# Patient Record
Sex: MALE | Race: WHITE | Employment: OTHER | URBAN - NONMETROPOLITAN AREA
[De-identification: names, ages, dates, MRNs, and addresses within clinical notes are randomized per-mention and may not be internally consistent; named-entity substitution may affect disease eponyms.]

---

## 2019-11-07 ENCOUNTER — OFFICE VISIT (OUTPATIENT)
Dept: SURGERY | Age: 64
End: 2019-11-07

## 2019-11-07 ENCOUNTER — TELEPHONE (OUTPATIENT)
Dept: SURGERY | Age: 64
End: 2019-11-07

## 2019-11-07 VITALS
OXYGEN SATURATION: 97 % | BODY MASS INDEX: 32.78 KG/M2 | WEIGHT: 229 LBS | HEART RATE: 90 BPM | RESPIRATION RATE: 20 BRPM | DIASTOLIC BLOOD PRESSURE: 72 MMHG | HEIGHT: 70 IN | SYSTOLIC BLOOD PRESSURE: 108 MMHG | TEMPERATURE: 96.7 F

## 2019-11-07 DIAGNOSIS — Z12.11 ENCOUNTER FOR SCREENING COLONOSCOPY: Primary | ICD-10-CM

## 2019-11-07 PROCEDURE — 99999 PR OFFICE/OUTPT VISIT,PROCEDURE ONLY: CPT | Performed by: SURGERY

## 2019-11-07 RX ORDER — CARBIDOPA AND LEVODOPA 50; 200 MG/1; MG/1
1 TABLET, EXTENDED RELEASE ORAL 4 TIMES DAILY
COMMUNITY
Start: 2019-08-19

## 2019-11-07 RX ORDER — SODIUM CHLORIDE 450 MG/100ML
INJECTION, SOLUTION INTRAVENOUS CONTINUOUS
Status: CANCELLED | OUTPATIENT
Start: 2019-11-07

## 2019-11-07 RX ORDER — SERTRALINE HYDROCHLORIDE 100 MG/1
1 TABLET, FILM COATED ORAL DAILY
COMMUNITY
Start: 2019-03-28

## 2019-11-07 RX ORDER — SITAGLIPTIN 100 MG/1
1 TABLET, FILM COATED ORAL DAILY
Refills: 3 | COMMUNITY
Start: 2019-09-13

## 2019-11-08 ASSESSMENT — ENCOUNTER SYMPTOMS
SINUS PAIN: 0
SORE THROAT: 0
FACIAL SWELLING: 0
PHOTOPHOBIA: 0
RESPIRATORY NEGATIVE: 1
WHEEZING: 0
VOICE CHANGE: 0
EYES NEGATIVE: 1
ALLERGIC/IMMUNOLOGIC NEGATIVE: 1
STRIDOR: 0
COLOR CHANGE: 0
RHINORRHEA: 0
CHEST TIGHTNESS: 0
BACK PAIN: 0
GASTROINTESTINAL NEGATIVE: 1
APNEA: 0
EYE REDNESS: 0
SHORTNESS OF BREATH: 0
EYE ITCHING: 0
TROUBLE SWALLOWING: 0
COUGH: 0
ABDOMINAL DISTENTION: 0
ANAL BLEEDING: 0
EYE PAIN: 0
SINUS PRESSURE: 0
EYE DISCHARGE: 0
CHOKING: 0

## 2019-11-20 RX ORDER — SODIUM CHLORIDE 450 MG/100ML
INJECTION, SOLUTION INTRAVENOUS CONTINUOUS
Status: CANCELLED | OUTPATIENT
Start: 2019-11-20

## 2019-11-21 ENCOUNTER — ANESTHESIA EVENT (OUTPATIENT)
Dept: ENDOSCOPY | Age: 64
End: 2019-11-21
Payer: MEDICARE

## 2019-11-21 ENCOUNTER — HOSPITAL ENCOUNTER (OUTPATIENT)
Age: 64
Setting detail: OUTPATIENT SURGERY
Discharge: HOME | End: 2019-11-21
Attending: SURGERY | Admitting: SURGERY
Payer: MEDICARE

## 2019-11-21 ENCOUNTER — ANESTHESIA (OUTPATIENT)
Dept: ENDOSCOPY | Age: 64
End: 2019-11-21
Payer: MEDICARE

## 2019-11-21 VITALS
HEART RATE: 95 BPM | DIASTOLIC BLOOD PRESSURE: 70 MMHG | OXYGEN SATURATION: 96 % | SYSTOLIC BLOOD PRESSURE: 130 MMHG | RESPIRATION RATE: 16 BRPM | TEMPERATURE: 97.3 F

## 2019-11-21 VITALS
SYSTOLIC BLOOD PRESSURE: 106 MMHG | RESPIRATION RATE: 11 BRPM | OXYGEN SATURATION: 96 % | DIASTOLIC BLOOD PRESSURE: 65 MMHG

## 2019-11-21 PROCEDURE — 2580000003 HC RX 258: Performed by: NURSE ANESTHETIST, CERTIFIED REGISTERED

## 2019-11-21 PROCEDURE — 3609010300 HC COLONOSCOPY W/BIOPSY SINGLE/MULTIPLE: Performed by: SURGERY

## 2019-11-21 PROCEDURE — 2500000003 HC RX 250 WO HCPCS: Performed by: NURSE ANESTHETIST, CERTIFIED REGISTERED

## 2019-11-21 PROCEDURE — 3700000000 HC ANESTHESIA ATTENDED CARE: Performed by: SURGERY

## 2019-11-21 PROCEDURE — 6360000002 HC RX W HCPCS: Performed by: NURSE ANESTHETIST, CERTIFIED REGISTERED

## 2019-11-21 PROCEDURE — 2709999900 HC NON-CHARGEABLE SUPPLY: Performed by: SURGERY

## 2019-11-21 PROCEDURE — 3700000001 HC ADD 15 MINUTES (ANESTHESIA): Performed by: SURGERY

## 2019-11-21 PROCEDURE — 88305 TISSUE EXAM BY PATHOLOGIST: CPT

## 2019-11-21 PROCEDURE — 7100000000 HC PACU RECOVERY - FIRST 15 MIN: Performed by: SURGERY

## 2019-11-21 PROCEDURE — 7100000001 HC PACU RECOVERY - ADDTL 15 MIN: Performed by: SURGERY

## 2019-11-21 PROCEDURE — 45380 COLONOSCOPY AND BIOPSY: CPT | Performed by: SURGERY

## 2019-11-21 RX ORDER — PROPOFOL 10 MG/ML
INJECTION, EMULSION INTRAVENOUS PRN
Status: DISCONTINUED | OUTPATIENT
Start: 2019-11-21 | End: 2019-11-21 | Stop reason: SDUPTHER

## 2019-11-21 RX ORDER — LIDOCAINE HYDROCHLORIDE 20 MG/ML
INJECTION, SOLUTION INFILTRATION; PERINEURAL PRN
Status: DISCONTINUED | OUTPATIENT
Start: 2019-11-21 | End: 2019-11-21 | Stop reason: SDUPTHER

## 2019-11-21 RX ORDER — SODIUM CHLORIDE 9 MG/ML
INJECTION, SOLUTION INTRAVENOUS CONTINUOUS PRN
Status: DISCONTINUED | OUTPATIENT
Start: 2019-11-21 | End: 2019-11-21 | Stop reason: SDUPTHER

## 2019-11-21 RX ADMIN — PROPOFOL 100 MG: 10 INJECTION, EMULSION INTRAVENOUS at 08:12

## 2019-11-21 RX ADMIN — PROPOFOL 50 MG: 10 INJECTION, EMULSION INTRAVENOUS at 08:36

## 2019-11-21 RX ADMIN — PROPOFOL 100 MG: 10 INJECTION, EMULSION INTRAVENOUS at 08:21

## 2019-11-21 RX ADMIN — LIDOCAINE HYDROCHLORIDE 4 ML: 20 INJECTION, SOLUTION INFILTRATION; PERINEURAL at 08:12

## 2019-11-21 RX ADMIN — SODIUM CHLORIDE: 9 INJECTION, SOLUTION INTRAVENOUS at 08:10

## 2019-11-21 RX ADMIN — PROPOFOL 100 MG: 10 INJECTION, EMULSION INTRAVENOUS at 08:29

## 2019-12-04 DIAGNOSIS — Z12.11 ENCOUNTER FOR SCREENING COLONOSCOPY: ICD-10-CM

## 2024-04-04 ENCOUNTER — TELEPHONE (OUTPATIENT)
Dept: ADMINISTRATIVE | Age: 69
End: 2024-04-04

## 2024-04-04 NOTE — TELEPHONE ENCOUNTER
Pt spouse is calling and pt heart rate had went to 245 twice in February and his parkinson dr wants him to be seen and they are requesting Dr. Hernandez.  Please contact pt spouse, Luma at [phone]

## 2024-04-05 NOTE — TELEPHONE ENCOUNTER
Spoke to patient's wife.  They took him to ER [facility].     Requesting an appt.     Appt made with pt's wife.

## 2024-04-08 ENCOUNTER — OFFICE VISIT (OUTPATIENT)
Dept: CARDIOLOGY CLINIC | Age: 69
End: 2024-04-08
Payer: MEDICARE

## 2024-04-08 VITALS
DIASTOLIC BLOOD PRESSURE: 74 MMHG | BODY MASS INDEX: 27.98 KG/M2 | HEART RATE: 82 BPM | SYSTOLIC BLOOD PRESSURE: 112 MMHG | WEIGHT: 195 LBS

## 2024-04-08 DIAGNOSIS — I10 PRIMARY HYPERTENSION: ICD-10-CM

## 2024-04-08 DIAGNOSIS — I25.10 CORONARY ARTERY DISEASE INVOLVING NATIVE CORONARY ARTERY OF NATIVE HEART WITHOUT ANGINA PECTORIS: ICD-10-CM

## 2024-04-08 DIAGNOSIS — R00.0 TACHYCARDIA: Primary | ICD-10-CM

## 2024-04-08 PROCEDURE — 99204 OFFICE O/P NEW MOD 45 MIN: CPT | Performed by: NUCLEAR MEDICINE

## 2024-04-08 PROCEDURE — 3017F COLORECTAL CA SCREEN DOC REV: CPT | Performed by: NUCLEAR MEDICINE

## 2024-04-08 PROCEDURE — G8419 CALC BMI OUT NRM PARAM NOF/U: HCPCS | Performed by: NUCLEAR MEDICINE

## 2024-04-08 PROCEDURE — 3078F DIAST BP <80 MM HG: CPT | Performed by: NUCLEAR MEDICINE

## 2024-04-08 PROCEDURE — 3074F SYST BP LT 130 MM HG: CPT | Performed by: NUCLEAR MEDICINE

## 2024-04-08 PROCEDURE — 93000 ELECTROCARDIOGRAM COMPLETE: CPT | Performed by: NUCLEAR MEDICINE

## 2024-04-08 PROCEDURE — 4004F PT TOBACCO SCREEN RCVD TLK: CPT | Performed by: NUCLEAR MEDICINE

## 2024-04-08 PROCEDURE — 1123F ACP DISCUSS/DSCN MKR DOCD: CPT | Performed by: NUCLEAR MEDICINE

## 2024-04-08 PROCEDURE — G8427 DOCREV CUR MEDS BY ELIG CLIN: HCPCS | Performed by: NUCLEAR MEDICINE

## 2024-04-08 RX ORDER — LANOLIN ALCOHOL/MO/W.PET/CERES
CREAM (GRAM) TOPICAL DAILY
COMMUNITY

## 2024-04-08 RX ORDER — METOPROLOL TARTRATE 50 MG/1
50 TABLET, FILM COATED ORAL DAILY
COMMUNITY
Start: 2024-03-09

## 2024-04-08 RX ORDER — MULTIVIT-MIN/IRON/FOLIC ACID/K 18-600-40
2000 CAPSULE ORAL DAILY
COMMUNITY

## 2024-04-08 RX ORDER — FLUDROCORTISONE ACETATE 0.1 MG/1
0.1 TABLET ORAL DAILY
COMMUNITY
Start: 2024-03-26

## 2024-04-08 RX ORDER — MAGNESIUM GLUCONATE 27 MG(500)
250 TABLET ORAL DAILY
COMMUNITY

## 2024-04-08 RX ORDER — QUETIAPINE FUMARATE 25 MG/1
25 TABLET, FILM COATED ORAL
COMMUNITY
Start: 2022-10-16 | End: 2024-04-08

## 2024-04-08 RX ORDER — RIVASTIGMINE 9.5 MG/24H
1 PATCH, EXTENDED RELEASE TRANSDERMAL DAILY
COMMUNITY
Start: 2022-05-22

## 2024-04-08 ASSESSMENT — ENCOUNTER SYMPTOMS
ABDOMINAL PAIN: 0
PHOTOPHOBIA: 0
DIARRHEA: 0
BLOOD IN STOOL: 0
COLOR CHANGE: 0
CONSTIPATION: 0
VOMITING: 0
SHORTNESS OF BREATH: 0
CHEST TIGHTNESS: 0
RECTAL PAIN: 0
BACK PAIN: 1
ABDOMINAL DISTENTION: 0
ANAL BLEEDING: 0
NAUSEA: 0

## 2024-04-08 NOTE — PROGRESS NOTES
[facility] [place] [facility]  [facility] CARDIOLOGY  [address]  [address]  [facility] [address]  Dept: [phone]  Dept Fax: [phone]  Loc: [phone]    Visit Date: 4/8/2024    Lukas Conner is a 69 y.o. male who presents todayfor:  Chief Complaint   Patient presents with    Hypertension    Coronary Artery Disease    Dizziness   Here for the first time  Saw Wojciech before  Cath 2013   Mild CAD  Severe disability from Parkinson's   As well dementia  Here because he was in the ER for high BP  Did have some symptoms with it   Labile BP  Some limitation and dizziness  Runs low at times   No active chest pain   Some baseline dyspnea  No smoking  Family history of CAD      HPI:  Hypertension  Pertinent negatives include no chest pain, neck pain, palpitations or shortness of breath.   Coronary Artery Disease  Symptoms include dizziness. Pertinent negatives include no chest pain, chest tightness, palpitations or shortness of breath.   Dizziness  Associated symptoms include arthralgias and fatigue. Pertinent negatives include no abdominal pain, chest pain, joint swelling, myalgias, nausea, neck pain, rash or vomiting.     Past Medical History:   Diagnosis Date    Abnormal EKG w/ generalized low voltages and possible inferior wall MI     Diabetes mellitus (HCC)     HLD (hyperlipidemia)     HTN (hypertension)     Parkinson disease (HCC)       Past Surgical History:   Procedure Laterality Date    COLECTOMY  2/24/11    Stress test - No evidence of stress induced ischemia or prior transmural MI. EF 63%    COLONOSCOPY      COLONOSCOPY Left 11/21/2019    COLONOSCOPY WITH BIOPSY performed by Wili Ritter MD at [facility]    HERNIA REPAIR  2004    TRANSTHORACIC ECHOCARDIOGRAM  02/18/2011    Left ventricle mild to mod dilated. EF 55-65%. No regional wall motion abnormalities. Right ventricle mildly dilated.      Family History   Problem Relation Age of Onset    Alzheimer's Disease Mother

## 2024-04-17 ENCOUNTER — HOSPITAL ENCOUNTER (OUTPATIENT)
Age: 69
Discharge: HOME | End: 2024-04-19
Attending: NUCLEAR MEDICINE
Payer: MEDICARE

## 2024-04-17 VITALS
SYSTOLIC BLOOD PRESSURE: 112 MMHG | HEIGHT: 70 IN | WEIGHT: 195 LBS | BODY MASS INDEX: 27.92 KG/M2 | DIASTOLIC BLOOD PRESSURE: 74 MMHG

## 2024-04-17 DIAGNOSIS — I25.10 CORONARY ARTERY DISEASE INVOLVING NATIVE CORONARY ARTERY OF NATIVE HEART WITHOUT ANGINA PECTORIS: ICD-10-CM

## 2024-04-17 DIAGNOSIS — R00.0 TACHYCARDIA: ICD-10-CM

## 2024-04-17 DIAGNOSIS — I10 PRIMARY HYPERTENSION: ICD-10-CM

## 2024-04-17 LAB
ECHO AO ASC DIAM: 4.3 CM
ECHO AO ASCENDING AORTA INDEX: 2.09 CM/M2
ECHO AO SINUS VALSALVA DIAM: 4.6 CM
ECHO AO SINUS VALSALVA INDEX: 2.23 CM/M2
ECHO AV CUSP MM: 2.2 CM
ECHO AV PEAK GRADIENT: 3 MMHG
ECHO AV PEAK VELOCITY: 0.9 M/S
ECHO AV VELOCITY RATIO: 0.89
ECHO BSA: 2.09 M2
ECHO LA AREA 2C: 11.9 CM2
ECHO LA AREA 4C: 13.9 CM2
ECHO LA DIAMETER INDEX: 1.84 CM/M2
ECHO LA DIAMETER: 3.8 CM
ECHO LA MAJOR AXIS: 4.1 CM
ECHO LA MINOR AXIS: 4.2 CM
ECHO LA VOL BP: 33 ML (ref 18–58)
ECHO LA VOL MOD A2C: 28 ML (ref 18–58)
ECHO LA VOL MOD A4C: 38 ML (ref 18–58)
ECHO LA VOL/BSA BIPLANE: 16 ML/M2 (ref 16–34)
ECHO LA VOLUME INDEX MOD A2C: 14 ML/M2 (ref 16–34)
ECHO LA VOLUME INDEX MOD A4C: 18 ML/M2 (ref 16–34)
ECHO LV E' LATERAL VELOCITY: 4 CM/S
ECHO LV E' SEPTAL VELOCITY: 5 CM/S
ECHO LV EDV A2C: 75 ML
ECHO LV EDV A4C: 92 ML
ECHO LV EDV INDEX A4C: 45 ML/M2
ECHO LV EDV NDEX A2C: 36 ML/M2
ECHO LV EJECTION FRACTION A2C: 50 %
ECHO LV EJECTION FRACTION A4C: 40 %
ECHO LV EJECTION FRACTION BIPLANE: 45 % (ref 55–100)
ECHO LV ESV A2C: 37 ML
ECHO LV ESV A4C: 55 ML
ECHO LV ESV INDEX A2C: 18 ML/M2
ECHO LV ESV INDEX A4C: 27 ML/M2
ECHO LV FRACTIONAL SHORTENING: 23 % (ref 28–44)
ECHO LV INTERNAL DIMENSION DIASTOLE INDEX: 2.28 CM/M2
ECHO LV INTERNAL DIMENSION DIASTOLIC: 4.7 CM (ref 4.2–5.9)
ECHO LV INTERNAL DIMENSION SYSTOLIC INDEX: 1.75 CM/M2
ECHO LV INTERNAL DIMENSION SYSTOLIC: 3.6 CM
ECHO LV ISOVOLUMETRIC RELAXATION TIME (IVRT): 134 MS
ECHO LV IVSD: 1.7 CM (ref 0.6–1)
ECHO LV MASS 2D: 309 G (ref 88–224)
ECHO LV MASS INDEX 2D: 150 G/M2 (ref 49–115)
ECHO LV POSTERIOR WALL DIASTOLIC: 1.4 CM (ref 0.6–1)
ECHO LV RELATIVE WALL THICKNESS RATIO: 0.6
ECHO LVOT PEAK GRADIENT: 3 MMHG
ECHO LVOT PEAK VELOCITY: 0.8 M/S
ECHO MV A VELOCITY: 1.42 M/S
ECHO MV E VELOCITY: 0.94 M/S
ECHO MV E/A RATIO: 0.66
ECHO MV E/E' LATERAL: 23.5
ECHO MV E/E' RATIO (AVERAGED): 21.15
ECHO PV MAX VELOCITY: 0.7 M/S
ECHO PV PEAK GRADIENT: 2 MMHG
ECHO RV INTERNAL DIMENSION: 3.1 CM

## 2024-04-17 PROCEDURE — 93306 TTE W/DOPPLER COMPLETE: CPT | Performed by: NUCLEAR MEDICINE

## 2024-04-17 PROCEDURE — 93306 TTE W/DOPPLER COMPLETE: CPT

## 2024-04-18 ENCOUNTER — TELEPHONE (OUTPATIENT)
Dept: CARDIOLOGY CLINIC | Age: 69
End: 2024-04-18

## 2024-04-18 NOTE — TELEPHONE ENCOUNTER
ECHO done.  Is patient cleared for colonoscopy with anesthesia?  Form in Dr. Jean-Baptiste's box.

## 2024-04-23 ENCOUNTER — HOSPITAL ENCOUNTER (INPATIENT)
Age: 69
LOS: 3 days | Discharge: HOME | End: 2024-04-26
Attending: SURGERY | Admitting: SURGERY
Payer: MEDICARE

## 2024-04-23 PROBLEM — F02.80 DEMENTIA ASSOCIATED WITH PARKINSON'S DISEASE (HCC): Status: ACTIVE | Noted: 2024-04-23

## 2024-04-23 PROBLEM — Z12.11 COLON CANCER SCREENING: Status: ACTIVE | Noted: 2024-04-23

## 2024-04-23 PROBLEM — G20.A1 DEMENTIA ASSOCIATED WITH PARKINSON'S DISEASE (HCC): Status: ACTIVE | Noted: 2024-04-23

## 2024-04-23 LAB
CREAT SERPL-MCNC: 0.5 MG/DL (ref 0.4–1.2)
GFR SERPL CREATININE-BSD FRML MDRD: > 90 ML/MIN/1.73M2

## 2024-04-23 PROCEDURE — 1200000000 HC SEMI PRIVATE

## 2024-04-23 PROCEDURE — 36415 COLL VENOUS BLD VENIPUNCTURE: CPT

## 2024-04-23 PROCEDURE — 2580000003 HC RX 258: Performed by: SURGERY

## 2024-04-23 PROCEDURE — 82565 ASSAY OF CREATININE: CPT

## 2024-04-23 PROCEDURE — 6370000000 HC RX 637 (ALT 250 FOR IP): Performed by: SURGERY

## 2024-04-23 RX ORDER — SODIUM CHLORIDE 9 MG/ML
INJECTION, SOLUTION INTRAVENOUS CONTINUOUS
Status: DISCONTINUED | OUTPATIENT
Start: 2024-04-23 | End: 2024-04-26 | Stop reason: HOSPADM

## 2024-04-23 RX ORDER — QUETIAPINE FUMARATE 25 MG/1
100 TABLET, FILM COATED ORAL NIGHTLY
COMMUNITY

## 2024-04-23 RX ORDER — POLYETHYLENE GLYCOL 3350 17 G/17G
238 POWDER, FOR SOLUTION ORAL ONCE
Status: COMPLETED | OUTPATIENT
Start: 2024-04-23 | End: 2024-04-23

## 2024-04-23 RX ORDER — BISACODYL 5 MG/1
20 TABLET, DELAYED RELEASE ORAL ONCE
Status: COMPLETED | OUTPATIENT
Start: 2024-04-23 | End: 2024-04-23

## 2024-04-23 RX ORDER — SODIUM CHLORIDE 9 MG/ML
INJECTION, SOLUTION INTRAVENOUS PRN
Status: DISCONTINUED | OUTPATIENT
Start: 2024-04-23 | End: 2024-04-26 | Stop reason: HOSPADM

## 2024-04-23 RX ORDER — POTASSIUM CHLORIDE 20 MEQ/1
40 TABLET, EXTENDED RELEASE ORAL PRN
Status: DISCONTINUED | OUTPATIENT
Start: 2024-04-23 | End: 2024-04-26 | Stop reason: HOSPADM

## 2024-04-23 RX ORDER — ONDANSETRON 4 MG/1
4 TABLET, ORALLY DISINTEGRATING ORAL EVERY 8 HOURS PRN
Status: DISCONTINUED | OUTPATIENT
Start: 2024-04-23 | End: 2024-04-26 | Stop reason: HOSPADM

## 2024-04-23 RX ORDER — ONDANSETRON 2 MG/ML
4 INJECTION INTRAMUSCULAR; INTRAVENOUS EVERY 6 HOURS PRN
Status: DISCONTINUED | OUTPATIENT
Start: 2024-04-23 | End: 2024-04-26 | Stop reason: HOSPADM

## 2024-04-23 RX ORDER — SODIUM CHLORIDE 0.9 % (FLUSH) 0.9 %
5-40 SYRINGE (ML) INJECTION PRN
Status: DISCONTINUED | OUTPATIENT
Start: 2024-04-23 | End: 2024-04-26 | Stop reason: HOSPADM

## 2024-04-23 RX ORDER — MAGNESIUM SULFATE IN WATER 40 MG/ML
2000 INJECTION, SOLUTION INTRAVENOUS PRN
Status: DISCONTINUED | OUTPATIENT
Start: 2024-04-23 | End: 2024-04-26 | Stop reason: HOSPADM

## 2024-04-23 RX ORDER — POTASSIUM CHLORIDE 7.45 MG/ML
10 INJECTION INTRAVENOUS PRN
Status: DISCONTINUED | OUTPATIENT
Start: 2024-04-23 | End: 2024-04-26 | Stop reason: HOSPADM

## 2024-04-23 RX ORDER — SODIUM CHLORIDE 0.9 % (FLUSH) 0.9 %
5-40 SYRINGE (ML) INJECTION EVERY 12 HOURS SCHEDULED
Status: DISCONTINUED | OUTPATIENT
Start: 2024-04-23 | End: 2024-04-26 | Stop reason: HOSPADM

## 2024-04-23 RX ADMIN — SODIUM CHLORIDE: 9 INJECTION, SOLUTION INTRAVENOUS at 16:09

## 2024-04-23 RX ADMIN — POLYETHYLENE GLYCOL 3350 238 G: 17 POWDER, FOR SOLUTION ORAL at 17:08

## 2024-04-23 RX ADMIN — SODIUM CHLORIDE: 9 INJECTION, SOLUTION INTRAVENOUS at 23:54

## 2024-04-23 RX ADMIN — BISACODYL 20 MG: 5 TABLET ORAL at 16:07

## 2024-04-23 ASSESSMENT — LIFESTYLE VARIABLES
HOW MANY STANDARD DRINKS CONTAINING ALCOHOL DO YOU HAVE ON A TYPICAL DAY: PATIENT DOES NOT DRINK
HOW OFTEN DO YOU HAVE A DRINK CONTAINING ALCOHOL: NEVER

## 2024-04-23 NOTE — TELEPHONE ENCOUNTER
Attempted pt's wife's phone #, just rings and rings.  Pre-op form has been signed and is in chart.  Closing encounter.

## 2024-04-23 NOTE — PROGRESS NOTES
RN contacted Dr. Ritter's office for bowel prep orders. 4 tablets dulcolax at 1500 and 255g of glycolax to be given with gatorade at 1700.

## 2024-04-24 LAB
GLUCOSE BLD STRIP.AUTO-MCNC: 65 MG/DL (ref 70–108)
GLUCOSE BLD STRIP.AUTO-MCNC: 75 MG/DL (ref 70–108)

## 2024-04-24 PROCEDURE — 6370000000 HC RX 637 (ALT 250 FOR IP): Performed by: SURGERY

## 2024-04-24 PROCEDURE — 1200000000 HC SEMI PRIVATE

## 2024-04-24 PROCEDURE — 2580000003 HC RX 258

## 2024-04-24 PROCEDURE — 82948 REAGENT STRIP/BLOOD GLUCOSE: CPT

## 2024-04-24 PROCEDURE — 2580000003 HC RX 258: Performed by: SURGERY

## 2024-04-24 RX ORDER — SERTRALINE HYDROCHLORIDE 100 MG/1
200 TABLET, FILM COATED ORAL NIGHTLY
Status: DISCONTINUED | OUTPATIENT
Start: 2024-04-24 | End: 2024-04-26 | Stop reason: HOSPADM

## 2024-04-24 RX ORDER — ENEMA 19; 7 G/133ML; G/133ML
1 ENEMA RECTAL DAILY PRN
Status: DISCONTINUED | OUTPATIENT
Start: 2024-04-24 | End: 2024-04-26 | Stop reason: HOSPADM

## 2024-04-24 RX ORDER — ATORVASTATIN CALCIUM 40 MG/1
40 TABLET, FILM COATED ORAL NIGHTLY
Status: DISCONTINUED | OUTPATIENT
Start: 2024-04-24 | End: 2024-04-26 | Stop reason: HOSPADM

## 2024-04-24 RX ORDER — ENEMA 19; 7 G/133ML; G/133ML
1 ENEMA RECTAL
Status: COMPLETED | OUTPATIENT
Start: 2024-04-24 | End: 2024-04-24

## 2024-04-24 RX ORDER — CHOLECALCIFEROL (VITAMIN D3) 125 MCG
10 CAPSULE ORAL NIGHTLY
Status: DISCONTINUED | OUTPATIENT
Start: 2024-04-24 | End: 2024-04-26 | Stop reason: HOSPADM

## 2024-04-24 RX ORDER — DEXTROSE MONOHYDRATE 100 MG/ML
INJECTION, SOLUTION INTRAVENOUS CONTINUOUS
Status: DISCONTINUED | OUTPATIENT
Start: 2024-04-24 | End: 2024-04-26 | Stop reason: HOSPADM

## 2024-04-24 RX ORDER — QUETIAPINE FUMARATE 100 MG/1
100 TABLET, FILM COATED ORAL NIGHTLY
Status: DISCONTINUED | OUTPATIENT
Start: 2024-04-24 | End: 2024-04-26 | Stop reason: HOSPADM

## 2024-04-24 RX ORDER — METOPROLOL TARTRATE 50 MG/1
50 TABLET, FILM COATED ORAL DAILY
Status: DISCONTINUED | OUTPATIENT
Start: 2024-04-24 | End: 2024-04-26 | Stop reason: HOSPADM

## 2024-04-24 RX ORDER — VITAMIN B COMPLEX
1000 TABLET ORAL DAILY
Status: DISCONTINUED | OUTPATIENT
Start: 2024-04-24 | End: 2024-04-26 | Stop reason: HOSPADM

## 2024-04-24 RX ADMIN — SODIUM CHLORIDE: 9 INJECTION, SOLUTION INTRAVENOUS at 09:32

## 2024-04-24 RX ADMIN — SODIUM PHOSPHATE 1 ENEMA: 7; 19 ENEMA RECTAL at 21:30

## 2024-04-24 RX ADMIN — Medication 10 MG: at 21:25

## 2024-04-24 RX ADMIN — QUETIAPINE FUMARATE 100 MG: 100 TABLET, FILM COATED ORAL at 21:26

## 2024-04-24 RX ADMIN — SODIUM CHLORIDE, PRESERVATIVE FREE 10 ML: 5 INJECTION INTRAVENOUS at 21:28

## 2024-04-24 RX ADMIN — SODIUM PHOSPHATE 1 ENEMA: 7; 19 ENEMA RECTAL at 11:45

## 2024-04-24 RX ADMIN — ATORVASTATIN CALCIUM 40 MG: 40 TABLET, FILM COATED ORAL at 21:24

## 2024-04-24 RX ADMIN — SODIUM PHOSPHATE 1 ENEMA: 7; 19 ENEMA RECTAL at 23:44

## 2024-04-24 RX ADMIN — DEXTROSE MONOHYDRATE: 100 INJECTION, SOLUTION INTRAVENOUS at 22:44

## 2024-04-24 RX ADMIN — SERTRALINE HYDROCHLORIDE 200 MG: 100 TABLET, FILM COATED ORAL at 21:26

## 2024-04-24 RX ADMIN — Medication 1000 UNITS: at 10:33

## 2024-04-24 RX ADMIN — Medication 2 TABLET: at 21:25

## 2024-04-24 RX ADMIN — Medication 2 TABLET: at 10:36

## 2024-04-24 RX ADMIN — SODIUM PHOSPHATE 1 ENEMA: 7; 19 ENEMA RECTAL at 09:25

## 2024-04-24 RX ADMIN — Medication 2 TABLET: at 15:33

## 2024-04-24 RX ADMIN — SODIUM PHOSPHATE 1 ENEMA: 7; 19 ENEMA RECTAL at 13:20

## 2024-04-24 RX ADMIN — METOPROLOL TARTRATE 50 MG: 50 TABLET, FILM COATED ORAL at 10:34

## 2024-04-24 NOTE — H&P
[address] [address]                           HISTORY & PHYSICAL      PATIENT NAME: ALEXANDER CASSIDY              : 1955  MED REC NO: [id number]                       ROOM: [facility]  ACCOUNT NO: [id number]                       ADMIT DATE: 2024  PROVIDER: Wili Ritter MD      CHIEF COMPLAINT:  Need for screening colonoscopy.    HISTORY OF PRESENT ILLNESS:  The patient is a 69-year-old male, in , I had performed a sigmoid resection for diverticular disease.  In 2019, we performed a colonoscopy.  At that time, he has a small cecal polyp removed.  He is having increasing constipation that has been ongoing over the last 2-3 months.  He is also having progressive issues with Parkinson disease and he goes to [facility] to see a physician.  He denies any change in medication, but again is having increased constipation.  He denies any blood in the stool.  However, he has become pretty much less mobile being confined to a chair or bed most of the time.  He has no family history of colon cancer.  He has had no weight loss, but because of the change in symptoms and polyps in the past, he is being admitted at this time for colonoscopy.  Unfortunately, because of his severe Parkinson's, his wife did not feel she would be able to give the prep at home and he has been admitted on the day before the planned colonoscopy with aid with the nurses for a prep.    PAST MEDICAL HISTORY:  Positive for angina, atrial fibrillation, hypertension, and diabetes.  He has progressive Parkinson disease.  He does have some mild dementia, depression.    PAST SURGICAL HISTORY:  Includes the sigmoid colon resection, colonoscopy.  He has had an inguinal hernia repair in the past and he has an echocardiogram.    MEDICATIONS:  Accupril, aspirin, atorvastatin, Colace, melatonin, metformin, metoprolol, MiraLAX, sertraline, Sinemet, Victoza.    ALLERGIES:

## 2024-04-24 NOTE — PROGRESS NOTES
04/24/24 0938   Encounter Summary   Encounter Overview/Reason  Spiritual/Emotional Needs   Service Provided For: Patient and family together   Referral/Consult From: Rounding   Support System Spouse;Children;Family members   Last Encounter  04/24/24   Complexity of Encounter Moderate   Begin Time 0928   End Time  0938   Total Time Calculated 10 min   Spiritual/Emotional needs   Type Spiritual Support   Assessment/Intervention/Outcome   Assessment Coping   Intervention Active listening;Empowerment;Nurtured Hope;Prayer (assurance of)/Creole;Sustaining Presence/Ministry of presence   Outcome Comfort     Assessment:  In my encounter with the 69 yr old patient the pt's family was supportively present. While rounding the unit 5K,  I provided spiritual care to patient and their family through conversation, I also came to assess their spiritual needs. The pt was admitted due to dementia associated with parkinson's disease.       Interventions:  I provided, prayer, emotional support and words of comfort.  provided a listening presence and encouraged pt and spouse to share their beliefs and how I can support them during their hospitalization.     The pt didn't speak although he was awake. The pt's spouse brought in a copy of the pt's Health Care Power of . I made a copy and sent it to Medical Records.     Outcomes:  The patient spouse was encouraged and didn't share any further spiritual needs at this time. The pt's spouse remains optimistic and hopeful.      Plan:  Chaplains will follow-up at a later time for assessment of any spiritual care needs present.

## 2024-04-24 NOTE — CARE COORDINATION
PCT informed this nurse of patient attempting to get out of bed without assist. New order for tele sitter received.

## 2024-04-24 NOTE — PLAN OF CARE
Problem: Chronic Conditions and Co-morbidities  Goal: Patient's chronic conditions and co-morbidity symptoms are monitored and maintained or improved  Outcome: Progressing  Flowsheets (Taken 4/23/2024 2103)  Care Plan - Patient's Chronic Conditions and Co-Morbidity Symptoms are Monitored and Maintained or Improved: Monitor and assess patient's chronic conditions and comorbid symptoms for stability, deterioration, or improvement     Problem: Safety - Adult  Goal: Free from fall injury  Outcome: Progressing     Problem: ABCDS Injury Assessment  Goal: Absence of physical injury  Outcome: Progressing

## 2024-04-24 NOTE — PLAN OF CARE
Problem: Chronic Conditions and Co-morbidities  Goal: Patient's chronic conditions and co-morbidity symptoms are monitored and maintained or improved  4/24/2024 0312 by Fay Estrella, RN  Outcome: Progressing     Problem: Safety - Adult  Goal: Free from fall injury  4/24/2024 0312 by Fay Estrella, RN  Outcome: Progressing     Problem: ABCDS Injury Assessment  Goal: Absence of physical injury  4/24/2024 0312 by Fay Estrella, RN  Outcome: Progressing

## 2024-04-24 NOTE — PROGRESS NOTES
[facility]   Dr. Wili Ritter MD  Daily Progress Note  Pt Name: Lukas Conner  Medical Record Number: [id number]  Date of Birth 1955   Today's Date: 4/24/2024    HD#1    CHIEF COMPLAINTSevere constipation    SUBJECTIVE  Patient Patient took all of the bowel prep and had no bowel movements colonoscopy was canceled that was scheduled for today    OBJECTIVE  CURRENT VITALS BP (!) 139/93   Pulse 97   Temp 98.5 °F (36.9 °C) (Oral)   Resp 17   Ht 1.778 m (5' 10\")   Wt 88.5 kg (195 lb)   SpO2 92%   BMI 27.98 kg/m²   LUNGS: Lungs clear   ABDOMEN: Soft bowel sounds positive  WOUNDS: Not applicable  24 HR INTAKE/OUTPUT :   Intake/Output Summary (Last 24 hours) at 4/24/2024 1838  Last data filed at 4/24/2024 1350  Gross per 24 hour   Intake 2217.13 ml   Output --   Net 2217.13 ml     DRAIN/TUBE OUTPUT :      LABS  CBC :   Lab Results   Component Value Date/Time    WBC 6.1 06/10/2013 08:30 AM    HGB 14.9 06/10/2013 08:30 AM    HCT 44.5 06/10/2013 08:30 AM     06/10/2013 08:30 AM     BMP:   Lab Results   Component Value Date/Time     06/10/2013 09:07 AM    K 4.6 06/10/2013 09:07 AM     06/10/2013 09:07 AM    CO2 30 06/10/2013 09:07 AM    BUN 10 06/10/2013 09:07 AM    CREATININE 0.5 04/23/2024 02:40 PM       ASSESSMENTPatient with severe Parkinson's disease who has had increasing constipation he was admitted yesterday for a planned colonoscopy this morning because his wife did not feel she would be able to help with the prep he was admitted again for the bowel prep he was given that last night apparently he drank everything had absolutely no bowel movement colonoscopy was canceled today and we have started enemas he apparently has had a lot of success with the enemas with a lot of bowel movement plan at this time is to trycolonoscopy tomorrow at noon plan colonoscopy for tomorrow      PLAN  1. As above      Wili Ritter MD  Electronically signed 4/24/2024 at 6:38 PM

## 2024-04-24 NOTE — CARE COORDINATION
Case Management Assessment Initial Evaluation    Date/Time of Evaluation: 4/24/2024 7:18 AM  Assessment Completed by: Katie Nuñez RN    If patient is discharged prior to next notation, then this note serves as note for discharge by case management.    Patient Name: Lukas Conner                   YOB: 1955  Diagnosis: Dementia associated with Parkinson's disease (HCC) [G20.A1, F02.80]  Colon cancer screening [Z12.11]                   Date / Time: 4/23/2024  1:45 PM  Location: [facility]04/004     Patient Admission Status: Inpatient   Readmission Risk Low 0-14, Mod 15-19), High > 20: No data recorded  Current PCP: Sreekanth Barajas MD    Additional Case Management Notes: Admit pre-op colonoscopy. General Surgery following. Telesitter at bedside. Bowel prep complete. IVF@75. NPO. Plan for colonoscopy today.     Vitals:    04/23/24 1531 04/23/24 2100 04/23/24 2354 04/24/24 0328   BP:  112/81 (!) 149/106 (!) 134/101   Pulse:  (!) 105 96 98   Resp:  18 18    Temp:  97.8 °F (36.6 °C) 97.5 °F (36.4 °C) 97.5 °F (36.4 °C)   TempSrc:  Oral Oral Oral   SpO2:  93% 98% 94%   Weight: 88.5 kg (195 lb)      Height: 1.778 m (5' 10\")      Procedures:   4/24 Plan for Colonoscopy w/ Dr. Ritter    Imaging: none    Patient Goals/Plan/Treatment Preferences: Met w/ Lukas and his wife Luma. Luma provides total care for Lukas with the help of a private duty aide 3x/week. Luma provides all transportation. They have a RW, Rollator and WC. Denies needs for HH or additional DME.    04/24/24 0903   Service Assessment   Patient Orientation Alert and Oriented;Person;Place   Cognition Dementia / Early Alzheimer's   History Provided By Spouse   Primary Caregiver Spouse   Accompanied By/Relationship wife Luma   Support Systems Spouse/Significant Other;Children;Family Members   Patient's Healthcare Decision Maker is: Legal Next of Kin  (Papers copied and placed on chart; Consult placed)   PCP Verified by CM Yes   Last

## 2024-04-24 NOTE — CARE COORDINATION
Upon walking past patients' room, patient noted to be rocking, attempting to get out of bed without assist. Patient educated on use of the call light system. Bed alarm confirmed to be on. Toileted at this time without success.

## 2024-04-24 NOTE — PLAN OF CARE
Problem: Chronic Conditions and Co-morbidities  Goal: Patient's chronic conditions and co-morbidity symptoms are monitored and maintained or improved  4/24/2024 1351 by Darlene Bates RN  Outcome: Progressing  Flowsheets (Taken 4/23/2024 2103 by Fay Estrella, RN)  Care Plan - Patient's Chronic Conditions and Co-Morbidity Symptoms are Monitored and Maintained or Improved: Monitor and assess patient's chronic conditions and comorbid symptoms for stability, deterioration, or improvement  4/24/2024 0312 by Fay Estrella RN  Outcome: Progressing     Problem: Safety - Adult  Goal: Free from fall injury  4/24/2024 1351 by Darlene Bates RN  Outcome: Progressing  Flowsheets (Taken 4/24/2024 1351)  Free From Fall Injury: Instruct family/caregiver on patient safety  4/24/2024 0312 by Fay Estrella RN  Outcome: Progressing     Problem: ABCDS Injury Assessment  Goal: Absence of physical injury  4/24/2024 1351 by Darlene Bates RN  Outcome: Progressing  Flowsheets (Taken 4/24/2024 1351)  Absence of Physical Injury: Implement safety measures based on patient assessment  4/24/2024 0312 by Fay Estrella RN  Outcome: Progressing   Care plan reviewed with patient and wife .  Patient and wife  verbalize understanding of the plan of care and contribute to goal setting.

## 2024-04-25 ENCOUNTER — ANESTHESIA (OUTPATIENT)
Dept: ENDOSCOPY | Age: 69
End: 2024-04-25
Payer: MEDICARE

## 2024-04-25 ENCOUNTER — ANESTHESIA EVENT (OUTPATIENT)
Dept: ENDOSCOPY | Age: 69
End: 2024-04-25
Payer: MEDICARE

## 2024-04-25 LAB
ALBUMIN SERPL BCG-MCNC: 3.8 G/DL (ref 3.5–5.1)
ALP SERPL-CCNC: 77 U/L (ref 38–126)
ALT SERPL W/O P-5'-P-CCNC: 7 U/L (ref 11–66)
ANION GAP SERPL CALC-SCNC: 17 MEQ/L (ref 8–16)
AST SERPL-CCNC: 10 U/L (ref 5–40)
BILIRUB SERPL-MCNC: 0.8 MG/DL (ref 0.3–1.2)
BUN SERPL-MCNC: 14 MG/DL (ref 7–22)
CALCIUM SERPL-MCNC: 9.7 MG/DL (ref 8.5–10.5)
CHLORIDE SERPL-SCNC: 103 MEQ/L (ref 98–111)
CO2 SERPL-SCNC: 23 MEQ/L (ref 23–33)
CREAT SERPL-MCNC: 0.7 MG/DL (ref 0.4–1.2)
GFR SERPL CREATININE-BSD FRML MDRD: > 90 ML/MIN/1.73M2
GLUCOSE BLD STRIP.AUTO-MCNC: 110 MG/DL (ref 70–108)
GLUCOSE BLD STRIP.AUTO-MCNC: 95 MG/DL (ref 70–108)
GLUCOSE SERPL-MCNC: 110 MG/DL (ref 70–108)
MAGNESIUM SERPL-MCNC: 2.2 MG/DL (ref 1.6–2.4)
POTASSIUM SERPL-SCNC: 4.1 MEQ/L (ref 3.5–5.2)
PROT SERPL-MCNC: 7 G/DL (ref 6.1–8)
SODIUM SERPL-SCNC: 143 MEQ/L (ref 135–145)

## 2024-04-25 PROCEDURE — 7100000011 HC PHASE II RECOVERY - ADDTL 15 MIN: Performed by: SURGERY

## 2024-04-25 PROCEDURE — 0DJD8ZZ INSPECTION OF LOWER INTESTINAL TRACT, VIA NATURAL OR ARTIFICIAL OPENING ENDOSCOPIC: ICD-10-PCS | Performed by: SURGERY

## 2024-04-25 PROCEDURE — 3700000000 HC ANESTHESIA ATTENDED CARE: Performed by: SURGERY

## 2024-04-25 PROCEDURE — 7100000010 HC PHASE II RECOVERY - FIRST 15 MIN: Performed by: SURGERY

## 2024-04-25 PROCEDURE — 6360000002 HC RX W HCPCS: Performed by: NURSE ANESTHETIST, CERTIFIED REGISTERED

## 2024-04-25 PROCEDURE — 2500000003 HC RX 250 WO HCPCS: Performed by: NURSE ANESTHETIST, CERTIFIED REGISTERED

## 2024-04-25 PROCEDURE — 82948 REAGENT STRIP/BLOOD GLUCOSE: CPT

## 2024-04-25 PROCEDURE — 36415 COLL VENOUS BLD VENIPUNCTURE: CPT

## 2024-04-25 PROCEDURE — 83735 ASSAY OF MAGNESIUM: CPT

## 2024-04-25 PROCEDURE — 2580000003 HC RX 258

## 2024-04-25 PROCEDURE — 6370000000 HC RX 637 (ALT 250 FOR IP): Performed by: SURGERY

## 2024-04-25 PROCEDURE — 2580000003 HC RX 258: Performed by: NURSE ANESTHETIST, CERTIFIED REGISTERED

## 2024-04-25 PROCEDURE — 3700000001 HC ADD 15 MINUTES (ANESTHESIA): Performed by: SURGERY

## 2024-04-25 PROCEDURE — 2580000003 HC RX 258: Performed by: SURGERY

## 2024-04-25 PROCEDURE — 80053 COMPREHEN METABOLIC PANEL: CPT

## 2024-04-25 PROCEDURE — 3609027000 HC COLONOSCOPY: Performed by: SURGERY

## 2024-04-25 PROCEDURE — 1200000000 HC SEMI PRIVATE

## 2024-04-25 PROCEDURE — 2709999900 HC NON-CHARGEABLE SUPPLY: Performed by: SURGERY

## 2024-04-25 RX ORDER — DOCUSATE SODIUM 100 MG/1
100 CAPSULE, LIQUID FILLED ORAL 2 TIMES DAILY
Status: DISCONTINUED | OUTPATIENT
Start: 2024-04-25 | End: 2024-04-26 | Stop reason: HOSPADM

## 2024-04-25 RX ORDER — LIDOCAINE HYDROCHLORIDE 20 MG/ML
INJECTION, SOLUTION EPIDURAL; INFILTRATION; INTRACAUDAL; PERINEURAL PRN
Status: DISCONTINUED | OUTPATIENT
Start: 2024-04-25 | End: 2024-04-25 | Stop reason: SDUPTHER

## 2024-04-25 RX ORDER — PROPOFOL 10 MG/ML
INJECTION, EMULSION INTRAVENOUS PRN
Status: DISCONTINUED | OUTPATIENT
Start: 2024-04-25 | End: 2024-04-25 | Stop reason: SDUPTHER

## 2024-04-25 RX ORDER — SODIUM CHLORIDE 9 MG/ML
INJECTION, SOLUTION INTRAVENOUS CONTINUOUS PRN
Status: DISCONTINUED | OUTPATIENT
Start: 2024-04-25 | End: 2024-04-25 | Stop reason: SDUPTHER

## 2024-04-25 RX ORDER — POLYETHYLENE GLYCOL 3350 17 G/17G
17 POWDER, FOR SOLUTION ORAL DAILY
Status: DISCONTINUED | OUTPATIENT
Start: 2024-04-25 | End: 2024-04-26 | Stop reason: HOSPADM

## 2024-04-25 RX ADMIN — ATORVASTATIN CALCIUM 40 MG: 40 TABLET, FILM COATED ORAL at 22:01

## 2024-04-25 RX ADMIN — QUETIAPINE FUMARATE 100 MG: 100 TABLET, FILM COATED ORAL at 22:01

## 2024-04-25 RX ADMIN — Medication 1000 UNITS: at 08:37

## 2024-04-25 RX ADMIN — PROPOFOL 150 MG: 10 INJECTION, EMULSION INTRAVENOUS at 12:16

## 2024-04-25 RX ADMIN — Medication 2 TABLET: at 22:01

## 2024-04-25 RX ADMIN — SODIUM CHLORIDE: 9 INJECTION, SOLUTION INTRAVENOUS at 22:06

## 2024-04-25 RX ADMIN — POLYETHYLENE GLYCOL (3350) 17 G: 17 POWDER, FOR SOLUTION ORAL at 13:56

## 2024-04-25 RX ADMIN — SODIUM CHLORIDE: 9 INJECTION, SOLUTION INTRAVENOUS at 12:06

## 2024-04-25 RX ADMIN — PHENYLEPHRINE HYDROCHLORIDE 100 MCG: 10 INJECTION INTRAVENOUS at 12:47

## 2024-04-25 RX ADMIN — LIDOCAINE HYDROCHLORIDE 100 MG: 20 INJECTION, SOLUTION EPIDURAL; INFILTRATION; INTRACAUDAL; PERINEURAL at 12:10

## 2024-04-25 RX ADMIN — METOPROLOL TARTRATE 50 MG: 50 TABLET, FILM COATED ORAL at 08:35

## 2024-04-25 RX ADMIN — DOCUSATE SODIUM 100 MG: 100 CAPSULE, LIQUID FILLED ORAL at 21:58

## 2024-04-25 RX ADMIN — SODIUM CHLORIDE, PRESERVATIVE FREE 10 ML: 5 INJECTION INTRAVENOUS at 21:58

## 2024-04-25 RX ADMIN — PHENYLEPHRINE HYDROCHLORIDE 100 MCG: 10 INJECTION INTRAVENOUS at 12:51

## 2024-04-25 RX ADMIN — SERTRALINE HYDROCHLORIDE 200 MG: 100 TABLET, FILM COATED ORAL at 22:02

## 2024-04-25 RX ADMIN — DOCUSATE SODIUM 100 MG: 100 CAPSULE, LIQUID FILLED ORAL at 13:56

## 2024-04-25 RX ADMIN — Medication 2 TABLET: at 17:49

## 2024-04-25 RX ADMIN — PHENYLEPHRINE HYDROCHLORIDE 200 MCG: 10 INJECTION INTRAVENOUS at 12:59

## 2024-04-25 RX ADMIN — Medication 2 TABLET: at 13:57

## 2024-04-25 RX ADMIN — PHENYLEPHRINE HYDROCHLORIDE 200 MCG: 10 INJECTION INTRAVENOUS at 12:24

## 2024-04-25 RX ADMIN — Medication 2 TABLET: at 08:35

## 2024-04-25 RX ADMIN — PROPOFOL 70 MG: 10 INJECTION, EMULSION INTRAVENOUS at 12:10

## 2024-04-25 RX ADMIN — Medication 10 MG: at 22:01

## 2024-04-25 ASSESSMENT — PAIN - FUNCTIONAL ASSESSMENT
PAIN_FUNCTIONAL_ASSESSMENT: NONE - DENIES PAIN

## 2024-04-25 ASSESSMENT — LIFESTYLE VARIABLES: SMOKING_STATUS: 0

## 2024-04-25 ASSESSMENT — ENCOUNTER SYMPTOMS: SHORTNESS OF BREATH: 0

## 2024-04-25 NOTE — PLAN OF CARE
Problem: Chronic Conditions and Co-morbidities  Goal: Patient's chronic conditions and co-morbidity symptoms are monitored and maintained or improved  4/24/2024 2355 by Hafsa Sharma RN  Outcome: Progressing  Flowsheets (Taken 4/24/2024 2355)  Care Plan - Patient's Chronic Conditions and Co-Morbidity Symptoms are Monitored and Maintained or Improved:   Monitor and assess patient's chronic conditions and comorbid symptoms for stability, deterioration, or improvement   Collaborate with multidisciplinary team to address chronic and comorbid conditions and prevent exacerbation or deterioration     Problem: Safety - Adult  Goal: Free from fall injury  4/24/2024 2355 by Hafsa Sharma RN  Outcome: Progressing  Flowsheets  Taken 4/24/2024 2355 by Hafsa Sharma RN  Free From Fall Injury: Instruct family/caregiver on patient safety  Taken 4/24/2024 1351 by Darlene Bates RN  Free From Fall Injury: Instruct family/caregiver on patient safety     Problem: ABCDS Injury Assessment  Goal: Absence of physical injury  4/24/2024 2355 by Hafsa Sharma RN  Outcome: Progressing  Flowsheets  Taken 4/24/2024 2355 by Hafsa Sharma RN  Absence of Physical Injury: Implement safety measures based on patient assessment  Taken 4/24/2024 1351 by Darlene Bates RN  Absence of Physical Injury: Implement safety measures based on patient assessment     Problem: Discharge Planning  Goal: Discharge to home or other facility with appropriate resources  Outcome: Progressing  Flowsheets (Taken 4/24/2024 2356)  Discharge to home or other facility with appropriate resources:   Identify barriers to discharge with patient and caregiver   Identify discharge learning needs (meds, wound care, etc)   Arrange for needed discharge resources and transportation as appropriate   Refer to discharge planning if patient needs post-hospital services based on physician order or complex needs related to functional status, cognitive ability or social  support system     Problem: Skin/Tissue Integrity  Goal: Absence of new skin breakdown  Description: 1.  Monitor for areas of redness and/or skin breakdown  2.  Assess vascular access sites hourly  3.  Every 4-6 hours minimum:  Change oxygen saturation probe site  4.  Every 4-6 hours:  If on nasal continuous positive airway pressure, respiratory therapy assess nares and determine need for appliance change or resting period.  Outcome: Progressing  Note: No new skin breakdown noted

## 2024-04-25 NOTE — PROGRESS NOTES
Colonoscopy completed, tolerated well. 1 polyps removed with hot snare, polyp unretrievable. Photos taken.     Scope #  used.

## 2024-04-25 NOTE — BRIEF OP NOTE
Brief Postoperative Note      Patient: Lukas Conner  YOB: 1955  MRN: [id number]    Date of Procedure: 4/25/2024    Pre-Op Diagnosis Codes:     * Screening for colon cancer [Z12.11]     * Constipation, unspecified constipation type [K59.00]    Post-Op Diagnosis: 1.Cecal POLYP OTHERWISE NL COLONOSCOPY       Procedure(s):  COLONOSCOPY    Surgeon(s):  Wili Ritter MD    Assistant:      Anesthesia: Monitor Anesthesia Care    Estimated Blood Loss (mL): 1 mlexcept for small polyp    Complications: NONE    Specimens:       Implants:        Drains: NONE    Findings:  Infection Present At Time Of Surgery (PATOS) (choose all levels that have infection present):  No infection present  Other Findings: SEE OP NOTE    Electronically signed by Wili Ritter MD on 4/25/2024 at 12:54 PM

## 2024-04-25 NOTE — PROGRESS NOTES
Recovery mode. Patient denies discomfort. Passing gas, taking fluids. Dr. Ritter discussed findings with patient and wife. Report called to Paty GRAY on 5K. Discharge instructions provided and understanding verbalized.

## 2024-04-25 NOTE — ANESTHESIA POSTPROCEDURE EVALUATION
Department of Anesthesiology  Postprocedure Note    Patient: Lukas Conner  MRN: [id number]  YOB: 1955  Date of evaluation: 4/25/2024    Procedure Summary       Date: 04/25/24 Room / Location: [place] [address] / [facility]    Anesthesia Start: 1206 Anesthesia Stop: 1254    Procedure: COLONOSCOPY Diagnosis:       Screening for colon cancer      Constipation, unspecified constipation type      (Screening for colon cancer [Z12.11])      (Constipation, unspecified constipation type [K59.00])    Surgeons: Wili Ritter MD Responsible Provider: Colton Gray DO    Anesthesia Type: MAC, TIVA ASA Status: 3            Anesthesia Type: No value filed.    Lalo Phase I:      Lalo Phase II: Lalo Score: 8    Anesthesia Post Evaluation    Patient location during evaluation: bedside  Patient participation: complete - patient participated  Level of consciousness: awake and alert  Pain score: 0  Airway patency: patent  Nausea & Vomiting: no nausea and no vomiting  Cardiovascular status: blood pressure returned to baseline  Respiratory status: spontaneous ventilation and room air  Hydration status: stable  Pain management: satisfactory to patient        No notable events documented.

## 2024-04-25 NOTE — PROGRESS NOTES
admitted to Endo department and admitted to Endo room 13  Plan of care reviewed with patient.   Call light within reach.   Bed in lowest position, locked, with one bed rail up.   Appropriate arm bands on patient.   Bathroom offered.   All questions and concerns of patient addressed    Name: Pat  Relationship to patient:   Phone number: [phone]

## 2024-04-25 NOTE — PLAN OF CARE
Problem: Chronic Conditions and Co-morbidities  Goal: Patient's chronic conditions and co-morbidity symptoms are monitored and maintained or improved  Outcome: Progressing  Flowsheets (Taken 4/25/2024 1434)  Care Plan - Patient's Chronic Conditions and Co-Morbidity Symptoms are Monitored and Maintained or Improved: Monitor and assess patient's chronic conditions and comorbid symptoms for stability, deterioration, or improvement     Problem: Discharge Planning  Goal: Discharge to home or other facility with appropriate resources  Outcome: Progressing  Flowsheets (Taken 4/25/2024 1434)  Discharge to home or other facility with appropriate resources: Identify barriers to discharge with patient and caregiver

## 2024-04-25 NOTE — ANESTHESIA PRE PROCEDURE
Department of Anesthesiology  Preprocedure Note       Name:  Lukas Conner   Age:  69 y.o.  :  1955                                          MRN:  [id number]         Date:  2024      Surgeon: Surgeon(s):  Wili Ritter MD    Procedure: Procedure(s):  COLONOSCOPY    Medications prior to admission:   Prior to Admission medications    Medication Sig Start Date End Date Taking? Authorizing Provider   carbidopa-levodopa (SINEMET)  MG per tablet Take 2 tablets by mouth 4 times daily   Yes María Elena Hummel MD   Insulin Degludec (TRESIBA SC) Inject 28 Units into the skin daily   Yes María Elena Hummel MD   Insulin Aspart (NOVOLOG SC) Inject 11 Units into the skin 2 times daily (with meals) With breakfast and dinner   Yes María Elena Hummel MD   Pimavanserin Tartrate (NUPLAZID PO) Take 34 mg by mouth Daily   Yes María Elena Hummel MD   QUEtiapine (SEROQUEL) 25 MG tablet Take 4 tablets by mouth at bedtime   Yes María Elena Hummel MD   fludrocortisone (FLORINEF) 0.1 MG tablet Take 1 tablet by mouth daily 3/26/24   María Elena Hummel MD   metoprolol tartrate (LOPRESSOR) 50 MG tablet Take 1 tablet by mouth daily 3/9/24   María Elena Hummel MD   rivastigmine (EXELON) 9.5 MG/24HR Place 1 patch onto the skin daily 22   María Elena Hummel MD   magnesium gluconate (MAGONATE) 500 MG tablet Take 0.5 tablets by mouth daily  Patient not taking: Reported on 2024    María Elena Hummel MD   Cholecalciferol (VITAMIN D) 50 MCG (2000) CAPS capsule Take 1,000 Units by mouth daily    María Elena Hummel MD   melatonin 3 MG TABS tablet Take 10 mg by mouth daily    María Elena Hummel MD   Liraglutide (VICTOZA) 18 MG/3ML SOPN SC injection Inject 0.6 mg into the skin daily    María Elena Hummel MD   sertraline (ZOLOFT) 100 MG tablet Take 2 tablets by mouth at bedtime 3/28/19   María Elena Hummel MD   metFORMIN (GLUCOPHAGE) 1000 MG tablet Take 1 tablet by mouth 2 times

## 2024-04-26 VITALS
TEMPERATURE: 97.5 F | RESPIRATION RATE: 18 BRPM | HEIGHT: 70 IN | HEART RATE: 89 BPM | WEIGHT: 195 LBS | DIASTOLIC BLOOD PRESSURE: 85 MMHG | BODY MASS INDEX: 27.92 KG/M2 | SYSTOLIC BLOOD PRESSURE: 131 MMHG | OXYGEN SATURATION: 100 %

## 2024-04-26 LAB — GLUCOSE BLD STRIP.AUTO-MCNC: 143 MG/DL (ref 70–108)

## 2024-04-26 PROCEDURE — 82948 REAGENT STRIP/BLOOD GLUCOSE: CPT

## 2024-04-26 PROCEDURE — 6370000000 HC RX 637 (ALT 250 FOR IP): Performed by: SURGERY

## 2024-04-26 PROCEDURE — 0DBH8ZZ EXCISION OF CECUM, VIA NATURAL OR ARTIFICIAL OPENING ENDOSCOPIC: ICD-10-PCS | Performed by: SURGERY

## 2024-04-26 RX ORDER — CARBIDOPA AND LEVODOPA 50; 200 MG/1; MG/1
2 TABLET, EXTENDED RELEASE ORAL 4 TIMES DAILY
Qty: 60 TABLET | Refills: 3 | OUTPATIENT
Start: 2024-04-26

## 2024-04-26 RX ADMIN — Medication 1000 UNITS: at 10:19

## 2024-04-26 RX ADMIN — DOCUSATE SODIUM 100 MG: 100 CAPSULE, LIQUID FILLED ORAL at 10:19

## 2024-04-26 RX ADMIN — METOPROLOL TARTRATE 50 MG: 50 TABLET, FILM COATED ORAL at 10:19

## 2024-04-26 RX ADMIN — Medication 2 TABLET: at 10:19

## 2024-04-26 RX ADMIN — POLYETHYLENE GLYCOL (3350) 17 G: 17 POWDER, FOR SOLUTION ORAL at 10:19

## 2024-04-26 RX ADMIN — Medication 2 TABLET: at 13:23

## 2024-04-26 NOTE — PROCEDURES
[address] [address]                             PROCEDURE NOTE      PATIENT NAME: ALEXANDER CASSIDY              : 1955  MED REC NO: [id number]                       ROOM: [facility]  ACCOUNT NO: [id number]                       ADMIT DATE: 2024  PROVIDER: Wili Ritter MD      DATE OF PROCEDURE:  2024    SURGEON:  Wili Ritter MD    PREOPERATIVE DIAGNOSIS:  Severe obstipation.    POSTOPERATIVE DIAGNOSES:  Small cecal polyp, otherwise normal colonoscopy to cecum with normal sigmoid anastomosis.    OPERATION:  Colonoscopy.    ANESTHESIA:  Diprivan.    COMPLICATIONS:  None.    INDICATIONS FOR PROCEDURE:  The patient is a very unfortunate 69-year-old male who has very severe Parkinson disease and has had ongoing constipation, but it has become significantly worse over the last 3 months.  It was felt that the patient should have a colonoscopy.  He does have a history of polyps.  He was actually brought into the hospital on the  to have a prep with the initial colonoscopy scheduled for yesterday, the .  However, he had absolutely no results with the prep.  The colonoscopy was canceled.  He has been given enemas throughout the day yesterday, overnight.  He is here for attempted colonoscopy.    FINDINGS:  Surprisingly, the patient's colon was cleaned out of large stool.  There was a fair amount of liquid stool present, but we got a good look.  There was no evidence of any strictures, narrowing, blockages.  The sigmoid anastomosis was about 20 cm.  There was a small polyp in the cecum removed, but otherwise no strictures or blockages to account for constipation.    DESCRIPTION OF PROCEDURE:  The patient was brought into the endoscopy suite, placed in the left lateral decubitus position.  After adequate Diprivan anesthesia was administered, the Olympus endoscope was inserted in the anus and surprisingly liquid stool,

## 2024-04-26 NOTE — PROGRESS NOTES
All discharge instructions given to patient and family with no further questions at this time. Patient discharged off unit via wheelchair. Chart contents placed in yellow bin.

## 2024-04-26 NOTE — PLAN OF CARE
Problem: Chronic Conditions and Co-morbidities  Goal: Patient's chronic conditions and co-morbidity symptoms are monitored and maintained or improved  4/26/2024 0045 by Toyin Ortiz RN  Outcome: Progressing  Flowsheets (Taken 4/26/2024 0020)  Care Plan - Patient's Chronic Conditions and Co-Morbidity Symptoms are Monitored and Maintained or Improved:   Monitor and assess patient's chronic conditions and comorbid symptoms for stability, deterioration, or improvement   Collaborate with multidisciplinary team to address chronic and comorbid conditions and prevent exacerbation or deterioration   Update acute care plan with appropriate goals if chronic or comorbid symptoms are exacerbated and prevent overall improvement and discharge      Problem: Safety - Adult  Goal: Free from fall injury  Outcome: Progressing   Fall assessment completed.  Personal items within reach. Patient is also compliant with use of non-skid slippers.  Bed alarm on. Bed wheels locked. Tele-sitter on.     Problem: ABCDS Injury Assessment  Goal: Absence of physical injury  Outcome: Progressing   Patient using call light appropriately to call for assistance with ambulation to bathroom.  Personal items within reach. Patient is also compliant with use of non-skid slippers.     Problem: Discharge Planning  Goal: Discharge to home or other facility with appropriate resources  4/26/2024 0045 by Toyin Ortiz RN  Outcome: Progressing  Flowsheets (Taken 4/26/2024 0020)  Discharge to home or other facility with appropriate resources:   Identify barriers to discharge with patient and caregiver   Arrange for needed discharge resources and transportation as appropriate   Identify discharge learning needs (meds, wound care, etc)   Refer to discharge planning if patient needs post-hospital services based on physician order or complex needs related to functional status, cognitive ability or social support system   Discharge plan is home with wife.  Awaiting for further discnharge instructions and goals.     Problem: Skin/Tissue Integrity  Goal: Absence of new skin breakdown  Description: 1.  Monitor for areas of redness and/or skin breakdown  2.  Assess vascular access sites hourly  3.  Every 4-6 hours minimum:  Change oxygen saturation probe site  4.  Every 4-6 hours:  If on nasal continuous positive airway pressure, respiratory therapy assess nares and determine need for appliance change or resting period.  Outcome: Progressing     Problem: Neurosensory - Adult  Goal: Achieves stable or improved neurological status  Outcome: Progressing  Flowsheets (Taken 4/26/2024 0020)  Achieves stable or improved neurological status:   Assess for and report changes in neurological status   Initiate measures to prevent increased intracranial pressure   Maintain blood pressure and fluid volume within ordered parameters to optimize cerebral perfusion and minimize risk of hemorrhage   Monitor temperature, glucose, and sodium. Initiate appropriate interventions as ordered     Problem: Skin/Tissue Integrity - Adult  Goal: Incisions, wounds, or drain sites healing without S/S of infection  Outcome: Progressing     Problem: Musculoskeletal - Adult  Goal: Maintain proper alignment of affected body part  Outcome: Progressing  Flowsheets (Taken 4/26/2024 0020)  Maintain proper alignment of affected body part:   Support and protect limb and body alignment per provider's orders   Instruct and reinforce with patient and family use of appropriate assistive device and precautions (e.g. spinal or hip dislocation precautions)     Problem: Genitourinary - Adult  Goal: Absence of urinary retention  Outcome: Progressing  Flowsheets (Taken 4/26/2024 0020)  Absence of urinary retention:   Assess patient’s ability to void and empty bladder   Monitor intake/output and perform bladder scan as needed.    Patient educated on how to use incentive spirometer. Patient verbalized understanding and

## 2024-04-26 NOTE — PROGRESS NOTES
This RN on behalf of Dr Ritter with verbal orders completed medication reconciliation so AVS could be printed and pt can be d/c.

## 2024-04-26 NOTE — CARE COORDINATION
4/26/24, 11:37 AM EDT    Patient goals/plan/ treatment preferences discussed by  and .  Patient goals/plan/ treatment preferences reviewed with patient/ family.  Patient/ family verbalize understanding of discharge plan and are in agreement with goal/plan/treatment preferences.  Understanding was demonstrated using the teach back method.  AVS provided by RN at time of discharge, which includes all necessary medical information pertaining to the patients current course of illness, treatment, post-discharge goals of care, and treatment preferences.     Services At/After Discharge: None    Discussed at IDR anticipated discharge this evening. Notified Luma, Lukas's wife. She is agreeable with discharge today. Plan is for Lukas to return home w/ wife. Has DME. Has private duty aide 3x/week through [employer]. Declines needs for  services or additional DME. Luma will transport home.

## 2024-04-26 NOTE — PROGRESS NOTES
Surg  Pt doing well post colonoscopy  OK to d/c unfortunately I believe pt is going to have chronic bowel issues cont miralax and colace

## 2024-04-27 NOTE — DISCHARGE SUMMARY
[address] [address]                            DISCHARGE SUMMARY      PATIENT NAME: ALEXANDER CASSIDY              : 1955  MED REC NO: [id number]                       ROOM: [facility]  ACCOUNT NO: [id number]                       ADMIT DATE: 2024  PROVIDER: Wili Ritter MD      DISCHARGE DIAGNOSIS:  Severe obstipation.    SECONDARY DIAGNOSIS:  Small cecal polyp.    OPERATION:  Colonoscopy.    HOSPITAL COURSE:  The patient is an unfortunate 69-year-old male with Parkinson disease, who had been having progressive constipation over the last several months somewhat due to his Parkinson's medications, but also due to his progressive immobility.  When seen in the office, he has a history of having polyps.  It was felt that colonoscopy should be performed.  However, the wife did not feel that she would be able to give the prep at home, so he was admitted on the  with a planned colonoscopy on the .  The patient was given the prep, but absolutely had no results.  Thus, the colonoscopy was canceled on the , and he was given progressive enemas throughout the day and night and then was taken to the endoscopy suite yesterday, performing a colonoscopy.  Surprisingly, he had a reasonably good cleanout with all the enemas he had.  There was no real solid stool, but there was still a lot of liquids, but I was able to suction that out.  He also had a small cecal polyp.  Today, he is doing well.  He has not had a bowel movement yet, but it was felt he would be discharged home.  He is already on Colace and MiraLAX at home, but we are going to continue this.  We did have a long discussion with the wife that I believe he is getting to the point where he is going to be difficult to take care of at home, but nonetheless, he will be discharged.  He is being discharged in stable condition and will follow up with me as needed.  He is to

## 2024-05-23 PROBLEM — Z12.11 COLON CANCER SCREENING: Status: RESOLVED | Noted: 2024-04-23 | Resolved: 2024-05-23

## 2024-08-29 ENCOUNTER — APPOINTMENT (OUTPATIENT)
Dept: GENERAL RADIOLOGY | Age: 69
End: 2024-08-29
Payer: MEDICARE

## 2024-08-29 ENCOUNTER — HOSPITAL ENCOUNTER (EMERGENCY)
Age: 69
Discharge: HOME | End: 2024-08-30
Attending: EMERGENCY MEDICINE
Payer: MEDICARE

## 2024-08-29 DIAGNOSIS — G20.A1 PARKINSON'S DISEASE, UNSPECIFIED WHETHER DYSKINESIA PRESENT, UNSPECIFIED WHETHER MANIFESTATIONS FLUCTUATE (HCC): ICD-10-CM

## 2024-08-29 DIAGNOSIS — R05.9 COUGH, UNSPECIFIED TYPE: Primary | ICD-10-CM

## 2024-08-29 LAB
ALBUMIN SERPL BCG-MCNC: 4 G/DL (ref 3.5–5.1)
ALP SERPL-CCNC: 88 U/L (ref 38–126)
ALT SERPL W/O P-5'-P-CCNC: < 5 U/L (ref 11–66)
ANION GAP SERPL CALC-SCNC: 12 MEQ/L (ref 8–16)
AST SERPL-CCNC: 11 U/L (ref 5–40)
BASOPHILS ABSOLUTE: 0 THOU/MM3 (ref 0–0.1)
BASOPHILS NFR BLD AUTO: 0.5 %
BILIRUB CONJ SERPL-MCNC: < 0.1 MG/DL (ref 0.1–13.8)
BILIRUB SERPL-MCNC: 0.4 MG/DL (ref 0.3–1.2)
BUN SERPL-MCNC: 11 MG/DL (ref 7–22)
CALCIUM SERPL-MCNC: 9.7 MG/DL (ref 8.5–10.5)
CHLORIDE SERPL-SCNC: 101 MEQ/L (ref 98–111)
CO2 SERPL-SCNC: 27 MEQ/L (ref 23–33)
CREAT SERPL-MCNC: 0.5 MG/DL (ref 0.4–1.2)
DEPRECATED RDW RBC AUTO: 46.1 FL (ref 35–45)
EOSINOPHIL NFR BLD AUTO: 4.6 %
EOSINOPHILS ABSOLUTE: 0.3 THOU/MM3 (ref 0–0.4)
ERYTHROCYTE [DISTWIDTH] IN BLOOD BY AUTOMATED COUNT: 12.7 % (ref 11.5–14.5)
FLUAV RNA RESP QL NAA+PROBE: NOT DETECTED
FLUBV RNA RESP QL NAA+PROBE: NOT DETECTED
GFR SERPL CREATININE-BSD FRML MDRD: > 90 ML/MIN/1.73M2
GLUCOSE SERPL-MCNC: 117 MG/DL (ref 70–108)
HCT VFR BLD AUTO: 41.4 % (ref 42–52)
HGB BLD-MCNC: 13.7 GM/DL (ref 14–18)
IMM GRANULOCYTES # BLD AUTO: 0.01 THOU/MM3 (ref 0–0.07)
IMM GRANULOCYTES NFR BLD AUTO: 0.2 %
LIPASE SERPL-CCNC: 21.6 U/L (ref 5.6–51.3)
LYMPHOCYTES ABSOLUTE: 1.4 THOU/MM3 (ref 1–4.8)
LYMPHOCYTES NFR BLD AUTO: 25.6 %
MAGNESIUM SERPL-MCNC: 1.9 MG/DL (ref 1.6–2.4)
MCH RBC QN AUTO: 32.9 PG (ref 26–33)
MCHC RBC AUTO-ENTMCNC: 33.1 GM/DL (ref 32.2–35.5)
MCV RBC AUTO: 99.3 FL (ref 80–94)
MONOCYTES ABSOLUTE: 0.7 THOU/MM3 (ref 0.4–1.3)
MONOCYTES NFR BLD AUTO: 12.1 %
NEUTROPHILS ABSOLUTE: 3.2 THOU/MM3 (ref 1.8–7.7)
NEUTROPHILS NFR BLD AUTO: 57 %
NRBC BLD AUTO-RTO: 0 /100 WBC
NT-PROBNP SERPL IA-MCNC: 174.2 PG/ML (ref 0–124)
OSMOLALITY SERPL CALC.SUM OF ELEC: 279.8 MOSMOL/KG (ref 275–300)
PLATELET # BLD AUTO: 202 THOU/MM3 (ref 130–400)
PMV BLD AUTO: 11.4 FL (ref 9.4–12.4)
POTASSIUM SERPL-SCNC: 3.8 MEQ/L (ref 3.5–5.2)
PROT SERPL-MCNC: 6.8 G/DL (ref 6.1–8)
RBC # BLD AUTO: 4.17 MILL/MM3 (ref 4.7–6.1)
SARS-COV-2 RNA RESP QL NAA+PROBE: NOT DETECTED
SODIUM SERPL-SCNC: 140 MEQ/L (ref 135–145)
TROPONIN, HIGH SENSITIVITY: 67 NG/L (ref 0–12)
WBC # BLD AUTO: 5.6 THOU/MM3 (ref 4.8–10.8)

## 2024-08-29 PROCEDURE — 85025 COMPLETE CBC W/AUTO DIFF WBC: CPT

## 2024-08-29 PROCEDURE — 71045 X-RAY EXAM CHEST 1 VIEW: CPT

## 2024-08-29 PROCEDURE — 81001 URINALYSIS AUTO W/SCOPE: CPT

## 2024-08-29 PROCEDURE — 99285 EMERGENCY DEPT VISIT HI MDM: CPT

## 2024-08-29 PROCEDURE — 84484 ASSAY OF TROPONIN QUANT: CPT

## 2024-08-29 PROCEDURE — 87636 SARSCOV2 & INF A&B AMP PRB: CPT

## 2024-08-29 PROCEDURE — 93005 ELECTROCARDIOGRAM TRACING: CPT | Performed by: EMERGENCY MEDICINE

## 2024-08-29 PROCEDURE — 80076 HEPATIC FUNCTION PANEL: CPT

## 2024-08-29 PROCEDURE — 80048 BASIC METABOLIC PNL TOTAL CA: CPT

## 2024-08-29 PROCEDURE — 83880 ASSAY OF NATRIURETIC PEPTIDE: CPT

## 2024-08-29 PROCEDURE — 83735 ASSAY OF MAGNESIUM: CPT

## 2024-08-29 PROCEDURE — 36415 COLL VENOUS BLD VENIPUNCTURE: CPT

## 2024-08-29 PROCEDURE — 83690 ASSAY OF LIPASE: CPT

## 2024-08-29 ASSESSMENT — PAIN - FUNCTIONAL ASSESSMENT: PAIN_FUNCTIONAL_ASSESSMENT: NONE - DENIES PAIN

## 2024-08-30 ENCOUNTER — APPOINTMENT (OUTPATIENT)
Dept: CT IMAGING | Age: 69
End: 2024-08-30
Payer: MEDICARE

## 2024-08-30 VITALS
HEIGHT: 70 IN | SYSTOLIC BLOOD PRESSURE: 131 MMHG | OXYGEN SATURATION: 95 % | HEART RATE: 74 BPM | DIASTOLIC BLOOD PRESSURE: 64 MMHG | BODY MASS INDEX: 25.05 KG/M2 | TEMPERATURE: 98.8 F | WEIGHT: 175 LBS | RESPIRATION RATE: 18 BRPM

## 2024-08-30 LAB
BACTERIA URNS QL MICRO: ABNORMAL /HPF
BILIRUB UR QL STRIP.AUTO: NEGATIVE
CASTS #/AREA URNS LPF: ABNORMAL /LPF
CASTS 2: ABNORMAL /LPF
CHARACTER UR: ABNORMAL
COLOR, UA: YELLOW
CRYSTALS URNS MICRO: ABNORMAL
EKG ATRIAL RATE: 73 BPM
EKG P AXIS: 49 DEGREES
EKG P-R INTERVAL: 214 MS
EKG Q-T INTERVAL: 420 MS
EKG QRS DURATION: 138 MS
EKG QTC CALCULATION (BAZETT): 462 MS
EKG R AXIS: -73 DEGREES
EKG T AXIS: 21 DEGREES
EKG VENTRICULAR RATE: 73 BPM
EPITHELIAL CELLS, UA: ABNORMAL /HPF
GLUCOSE UR QL STRIP.AUTO: NEGATIVE MG/DL
HGB UR QL STRIP.AUTO: NEGATIVE
KETONES UR QL STRIP.AUTO: ABNORMAL
MISCELLANEOUS 2: ABNORMAL
NITRITE UR QL STRIP: NEGATIVE
PH UR STRIP.AUTO: 7.5 [PH] (ref 5–9)
PROT UR STRIP.AUTO-MCNC: ABNORMAL MG/DL
RBC URINE: ABNORMAL /HPF
RENAL EPI CELLS #/AREA URNS HPF: ABNORMAL /[HPF]
SP GR UR REFRACT.AUTO: 1.02 (ref 1–1.03)
UROBILINOGEN, URINE: 1 EU/DL (ref 0–1)
WBC #/AREA URNS HPF: ABNORMAL /HPF
WBC #/AREA URNS HPF: ABNORMAL /[HPF]
YEAST LIKE FUNGI URNS QL MICRO: ABNORMAL

## 2024-08-30 PROCEDURE — 6360000004 HC RX CONTRAST MEDICATION: Performed by: EMERGENCY MEDICINE

## 2024-08-30 PROCEDURE — 93010 ELECTROCARDIOGRAM REPORT: CPT | Performed by: NUCLEAR MEDICINE

## 2024-08-30 PROCEDURE — 71260 CT THORAX DX C+: CPT

## 2024-08-30 RX ORDER — BENZONATATE 100 MG/1
100 CAPSULE ORAL 3 TIMES DAILY PRN
Qty: 30 CAPSULE | Refills: 0 | Status: SHIPPED | OUTPATIENT
Start: 2024-08-30 | End: 2024-09-09

## 2024-08-30 RX ORDER — IOPAMIDOL 755 MG/ML
80 INJECTION, SOLUTION INTRAVASCULAR
Status: COMPLETED | OUTPATIENT
Start: 2024-08-30 | End: 2024-08-30

## 2024-08-30 RX ADMIN — IOPAMIDOL 80 ML: 755 INJECTION, SOLUTION INTRAVENOUS at 00:19

## 2024-08-30 NOTE — ED NOTES
Pt resting on cot with call light in reach. VS reassessed. Respirations are easy and unlabored. Family at bedside. Pt denies any current needs at this time.

## 2024-08-30 NOTE — ED PROVIDER NOTES
I performed a history and physical examination of the patient and discussed management with the resident. I reviewed the resident’s note and agree with the documented findings and plan of care. Any areas of disagreement are noted on the chart. I was personally present for the key portions of any procedures. I have documented in the chart those procedures where I was not present during the key portions. I have reviewed the emergency nurses triage note. I agree with the chief complaint, past medical history, past surgical history, allergies, medications, social and family history as documented unless otherwise noted below. Documentation of the HPI, Physical Exam and Medical Decision Making performed by medical students or scribes is based on my personal performance of the HPI, PE and MDM. For Phys Assistant/ Nurse Practitioner cases/documentation I have personally evaluated this patient and have completed at least one if not all key elements of the E/M (history, physical exam, and MDM). My findings are as noted below.    In other words, I personally saw and examined the patient I have reviewed and agreed with the resident findings including all diagnostic interpretations and treatment plans as written.  I was present for the key portion of any procedures performed and the inclusive time noted in any critical care statement.    Patient presents today for cough.  Wife at bedside states that the patient started this morning with a cough.  He had coughing so hard that it look like his oxygen was going down.  That was the chief complaint and reason that the wife brought him in.  Here today he is 96 to 98% on room air.  Patient does have Parkinson's disease.  He is having tremors.  He also has some problems with some hypotension.  He had no syncopal episode today.  Patient is diabetic as well.  Patient does have a history of aspiration pneumonia.  Wife was concerned about that.  She states that he has not been displaying

## 2024-08-30 NOTE — ED NOTES
Pt resting in bed w/ family at bedside. Pt educated on need for urine sample at this time. Pt attempting to urinate in urinal. Pt breathing easy and unlabored. Call light in reach.

## 2024-08-30 NOTE — ED NOTES
Pt resting on cot with call light in reach. VS reassessed. Family at bedside. Respirations are easy and unlabored. Pt denies any current needs at this time.

## 2024-08-30 NOTE — ED TRIAGE NOTES
Pt to the ED from the lobby with family with chief complaint of a cough. Family states that the cough started this morning. Pt states that when he has a coughing spell it is hard for him to stop and he becomes short of breath. Pt denies any nausea. Family states that prior to arrival his SpO2 was at 78% on room air. Family states they gave patient a breathing treatment prior to arrival. On arrival, pt. SpO2 94% on room air. EKG complete and pt placed on telemetry. Respirations easy and unlabored. Call light within reach.

## 2024-08-30 NOTE — ED NOTES
Dr. Murillo notified of unable to get urine sample. States to try to place urinal in between legs to collect sample.

## 2024-08-30 NOTE — ED NOTES
ED nurse-to-nurse bedside report    Chief Complaint   Patient presents with    Cough      LOC: alert and orientated to name, place, date  Vital signs   Vitals:    08/29/24 2018 08/29/24 2139 08/29/24 2237   BP: (!) 157/82 (!) 147/96 134/80   Pulse: 70 70 83   Resp: 18 20 24   Temp: 98.8 °F (37.1 °C)     TempSrc: Oral     SpO2: 96% 96% 94%   Weight: 79.4 kg (175 lb)     Height: 1.778 m (5' 10\")        Pain:    Pain Interventions: N/A  Pain Goal: 0  Oxygen: No    Current needs required RA   Telemetry: Yes  LDAs:   Peripheral IV 08/29/24 Distal;Posterior;Right Forearm (Active)   Site Assessment Clean, dry & intact 08/29/24 2237   Line Status Normal saline locked 08/29/24 2237   Line Care Connections checked and tightened 08/29/24 2237   Phlebitis Assessment No symptoms 08/29/24 2237   Infiltration Assessment 0 08/29/24 2237     Continuous Infusions:   Mobility: Requires assistance * 2  Pandey Fall Risk Score:        No data to display              Fall Interventions: bed in lowest position, call light in reach, side rails x2, wheels locked   Report given to: Mecca GRAY

## 2024-08-30 NOTE — ED NOTES
Urine sample collected and sent to lab. Pt denies any needs at this time. Vitals collected. Call light in reach.

## 2024-08-30 NOTE — ED NOTES
Pt resting in bed w/ family at bedside. Pt updated on POC. Pt denies further needs at this time. Ice water provided to pt wife. Vitals collected. Pt breathing easy and unlabored. Call light in reach.

## 2024-08-30 NOTE — ED PROVIDER NOTES
[facility] EMERGENCY DEPT  EMERGENCY DEPARTMENT ENCOUNTER          Pt Name: Lukas Conner  MRN: [id number]  Birthdate 1955  Date of evaluation: 8/29/2024  Physician: Brandy Virk MD  Supervising Attending Physician: Chaim Murillo DO       CHIEF COMPLAINT       Chief Complaint   Patient presents with    Cough         HISTORY OF PRESENT ILLNESS    HPI  Lukas Conner is a 69 y.o. male with past medical history of diabetes mellitus, hyperlipidemia, hypertension, Parkinson disease, and abnormal EKG.  Who presents to the emergency department from home for evaluation of cough and shortness of breath.  The patient reported having a cough since early morning after he is taking shower and the cough is without phlegm.  The family members mentioned he has a shortness of breath and his oxygen level went down to 80s but he is currently having no shortness of breath or hypoxia.  Patient denies any of fever, chest pain, headache, abdominal pain, and weakness.   Review of system negative as above  The patient has no other acute complaints at this time.      PAST MEDICAL AND SURGICAL HISTORY     Past Medical History:   Diagnosis Date    Abnormal EKG w/ generalized low voltages and possible inferior wall MI     Diabetes mellitus (HCC)     HLD (hyperlipidemia)     HTN (hypertension)     Parkinson disease (HCC)      Past Surgical History:   Procedure Laterality Date    COLECTOMY  2/24/11    Stress test - No evidence of stress induced ischemia or prior transmural MI. EF 63%    COLONOSCOPY      COLONOSCOPY Left 11/21/2019    COLONOSCOPY WITH BIOPSY performed by Wili Ritter MD at [facility] Endoscopy    COLONOSCOPY N/A 4/25/2024    COLONOSCOPY performed by Wili Ritter MD at [facility] ENDOSCOPY    HERNIA REPAIR  2004    TRANSTHORACIC ECHOCARDIOGRAM  02/18/2011    Left ventricle mild to mod dilated. EF 55-65%. No regional wall motion abnormalities. Right ventricle mildly dilated.          MEDICATIONS  emergency department complaining of cough.  I spoke and evaluate the patient and the family and I ordered a workup for CT chest with contrast, x-ray of the chest, EKG, CBC with differential, troponin, hepatic function panel, lipase, BMP, magnesium, urine with reflex micro, BNP, COVID-19 and influenza combo.  Vital signs in the encounters was within normal limit, the urine with reflex shows some trace of ketones, protein, leukocyte.  COVID-19 and influenza was negative, CT scan of the chest shows No acute pulmonary pathology. No pneumonia or pulmonary edema. Dilated ascending thoracic aorta up to 4.1 cm. Mild cardiomegaly, with trace pericardial effusion.  X-ray of the chest shows  No acute cardiopulmonary process. Cardiomegaly with background interstitial edema.  The result has been discussed with patient and his wife.  The patient prescribed new medication for cough Tessalon 100 mg capsule.  I recommend the patient to follow-up with the primary care physicians for further evaluation and management, also I recommend the patient to follow-up with a neurologist for further evaluation and management about his Parkinson disease.  I instructed the patient to return to the emergency department if his symptoms return or worsen.  The patient expressed understanding and discharged home safely.    Patient given given instructions about his condition. Results discussed with patient. Patient verbalized understanding and agreement to plan -he is in stable condition. Refer to ED course for additional information.      ED COURSE   ED Medications administered this visit (None if left blank):   Medications   iopamidol (ISOVUE-370) 76 % injection 80 mL (80 mLs IntraVENous Given 8/30/24 0019)            PROCEDURES: (None if blank)  Procedures:     CRITICAL CARE:  None    MEDICATION CHANGES     New Prescriptions    No medications on file       FINAL DISPOSITION   Shared Decision-Making was performed, disposition discussed with the

## 2024-08-30 NOTE — DISCHARGE INSTRUCTIONS
You were seen today in the emergency department because of cough. Please follow up with PCP, Schedule an appointment with your primary care physician within 1 day for further evaluation and management. Please fellow up with your neurologist, and Schedule an appointment with as needed for further evaluation and management   Self-Care Instructions:  Hydration: Drink plenty of fluids to stay hydrated. unless otherwise directed by your PCP or your specialist.  Diet: Eat balanced meals regularly. Avoid skipping meals and ensure you're consuming a variety of nutrients.  Medication: Take all prescribed medications as directed. Follow the instructions on dosage and frequency carefully. Do not stop or alter your medication without consulting your physician.  When to Seek Immediate Care:  Worsening Symptoms: If you experience worsening of your symptoms, including [ severe chest pain, shortness of breath, fainting, confusion, blood in stool, severe abdominal pain, seizure, new onset of severe headache, weakness or numbness], you need to return immediately to the Emergency Department.   Acknowledgment:  Please acknowledge that you have understood these instructions and follow them carefully. Contact your healthcare provider if you have any questions or concerns.

## 2024-08-30 NOTE — ED NOTES
Complete linen change and gown change completed at this time due to pt urinating in cot. Urinal placed in between pt legs to collect urine sample. Vitals collected. Pt breathing easy and unlabored. Call light in reach.

## 2024-12-06 ENCOUNTER — OFFICE VISIT (OUTPATIENT)
Dept: CARDIOLOGY CLINIC | Age: 69
End: 2024-12-06
Payer: MEDICARE

## 2024-12-06 VITALS
HEIGHT: 70 IN | SYSTOLIC BLOOD PRESSURE: 86 MMHG | DIASTOLIC BLOOD PRESSURE: 62 MMHG | BODY MASS INDEX: 22.9 KG/M2 | WEIGHT: 160 LBS | HEART RATE: 96 BPM

## 2024-12-06 DIAGNOSIS — I25.10 CORONARY ARTERY DISEASE INVOLVING NATIVE CORONARY ARTERY OF NATIVE HEART WITHOUT ANGINA PECTORIS: ICD-10-CM

## 2024-12-06 DIAGNOSIS — I10 PRIMARY HYPERTENSION: Primary | ICD-10-CM

## 2024-12-06 PROCEDURE — 1036F TOBACCO NON-USER: CPT | Performed by: NUCLEAR MEDICINE

## 2024-12-06 PROCEDURE — 99214 OFFICE O/P EST MOD 30 MIN: CPT | Performed by: NUCLEAR MEDICINE

## 2024-12-06 PROCEDURE — 3017F COLORECTAL CA SCREEN DOC REV: CPT | Performed by: NUCLEAR MEDICINE

## 2024-12-06 PROCEDURE — 3074F SYST BP LT 130 MM HG: CPT | Performed by: NUCLEAR MEDICINE

## 2024-12-06 PROCEDURE — 3078F DIAST BP <80 MM HG: CPT | Performed by: NUCLEAR MEDICINE

## 2024-12-06 PROCEDURE — G8427 DOCREV CUR MEDS BY ELIG CLIN: HCPCS | Performed by: NUCLEAR MEDICINE

## 2024-12-06 PROCEDURE — 1159F MED LIST DOCD IN RCRD: CPT | Performed by: NUCLEAR MEDICINE

## 2024-12-06 PROCEDURE — 1123F ACP DISCUSS/DSCN MKR DOCD: CPT | Performed by: NUCLEAR MEDICINE

## 2024-12-06 PROCEDURE — G8484 FLU IMMUNIZE NO ADMIN: HCPCS | Performed by: NUCLEAR MEDICINE

## 2024-12-06 PROCEDURE — G8420 CALC BMI NORM PARAMETERS: HCPCS | Performed by: NUCLEAR MEDICINE

## 2024-12-06 NOTE — PROGRESS NOTES
6 month follow up.    Last EKG done on 08/29/2024.    Denies chest pain, palpitations, dizziness, shortness of breath, and edema.     No cardiac concerns at this time.       
Diabetic foot exam  Never done    A1C test (Diabetic or Prediabetic)  Never done    Lipids  Never done    Depression Screen  Never done    Diabetic Alb to Cr ratio (uACR) test  Never done    Diabetic retinal exam  Never done    Hepatitis C screen  Never done    DTaP/Tdap/Td vaccine (1 - Tdap) Never done    Shingles vaccine (1 of 2) Never done    Respiratory Syncytial Virus (RSV) Pregnant or age 60 yrs+ (1 - Risk 60-74 years 1-dose series) Never done    Pneumococcal 65+ years Vaccine (2 of 2 - PPSV23 or PCV20) 03/28/2017    AAA screen  Never done    Annual Wellness Visit (Medicare)  Never done    Flu vaccine (1) 08/01/2024    COVID-19 Vaccine (3 - 2023-24 season) 09/01/2024    GFR test (Diabetes, CKD 3-4, OR last GFR 15-59)  08/29/2025    Colorectal Cancer Screen  04/25/2029    Hepatitis A vaccine  Aged Out    Hepatitis B vaccine  Aged Out    Hib vaccine  Aged Out    Polio vaccine  Aged Out    Meningococcal (ACWY) vaccine  Aged Out    Pneumococcal 0-64 years Vaccine  Discontinued       Subjective:  General:   No fever, no chills, No fatigue or weight loss  Pulmonary:    No dyspnea, no wheezing  Cardiac:    Denies recent chest pain,   GI:     No nausea or vomiting, no abdominal pain  Neuro:    some dizziness or light headedness,   Musculoskeletal:  No recent active issues  Extremities:   No edema, no obvious claudication       Objective:  General:   Well developed, well nourished  Lungs:   Clear to auscultation  Heart:    Normal S1 S2, Slight murmur. no rubs, no gallops  Abdomen:   Soft, non tender, no organomegalies, positive bowel sounds  Extremities:   No edema, no cyanosis, good peripheral pulses  Neurological:   Awake, alert, oriented. No obvious focal deficits  Musculoskelatal:  No obvious deformities   BP (!) 86/62   Pulse 96   Ht 1.778 m (5' 10\")   Wt 72.6 kg (160 lb)   BMI 22.96 kg/m²     Assessment:  Assessment & Plan    Diagnosis Orders   1. Primary hypertension        2. Coronary artery disease

## (undated) DEVICE — CO2 CANNULA,SUPERSOFT, ADLT,7'O2,7'CO2: Brand: MEDLINE

## (undated) DEVICE — SET LNR RED GRN W/ BASE CLEANASCOPE

## (undated) DEVICE — GLOVE ORTHO 8   MSG9480

## (undated) DEVICE — GLOVE ORANGE PI 7 1/2   MSG9075

## (undated) DEVICE — KIT INF CTRL 2OZ LUB TBNG L12FT DBL END BRSH SYR OP4

## (undated) DEVICE — LINER SUCT CANSTR 1500CC SEMI RIG W/ POR HYDROPHOBIC SHUT